# Patient Record
Sex: MALE | Race: WHITE | URBAN - METROPOLITAN AREA
[De-identification: names, ages, dates, MRNs, and addresses within clinical notes are randomized per-mention and may not be internally consistent; named-entity substitution may affect disease eponyms.]

---

## 2018-08-18 ENCOUNTER — EMERGENCY (EMERGENCY)
Facility: HOSPITAL | Age: 33
LOS: 0 days | Discharge: HOME | End: 2018-08-18
Attending: EMERGENCY MEDICINE | Admitting: EMERGENCY MEDICINE

## 2018-08-18 VITALS
RESPIRATION RATE: 18 BRPM | SYSTOLIC BLOOD PRESSURE: 128 MMHG | HEART RATE: 61 BPM | TEMPERATURE: 98 F | DIASTOLIC BLOOD PRESSURE: 75 MMHG | OXYGEN SATURATION: 99 %

## 2018-08-18 VITALS
SYSTOLIC BLOOD PRESSURE: 167 MMHG | RESPIRATION RATE: 18 BRPM | HEART RATE: 56 BPM | DIASTOLIC BLOOD PRESSURE: 84 MMHG | OXYGEN SATURATION: 96 % | TEMPERATURE: 98 F

## 2018-08-18 DIAGNOSIS — K08.89 OTHER SPECIFIED DISORDERS OF TEETH AND SUPPORTING STRUCTURES: ICD-10-CM

## 2018-08-18 NOTE — ED PROVIDER NOTE - NS ED ROS FT
Review of Systems:  	•	CONSTITUTIONAL - no fever, no diaphoresis, no chills  	•	SKIN - no rash  	•	EYES - no eye pain, no blurry vision  	•	ENT - + dental pain   	•	RESPIRATORY - no shortness of breath, no cough  	•	CARDIAC - no chest pain, no palpitations

## 2018-08-18 NOTE — ED PROVIDER NOTE - OBJECTIVE STATEMENT
33 year old male with no pmhx presents with dental pain. Pt admits left lower tooth. pt went to dentist earlier today, given clindamycin. Pt denies swelling or fever.

## 2018-08-18 NOTE — ED PROVIDER NOTE - ATTENDING CONTRIBUTION TO CARE
34 yo m  presents with toothache.  Tender in left lower teeth   .  No abscess.  Requesting dental block.  Dental consult .  already on clindamycin.  will f/u with [facility], private on monday

## 2018-08-18 NOTE — ED PROVIDER NOTE - PHYSICAL EXAMINATION
CONST: Well appearing in NAD  EYES: PERRL, EOMI, Sclera and conjunctiva clear. Vision 20/20  ENT: + tenderness tooth 19, no swelling or abscess, No nasal discharge. TM's clear B/L without drainage. Oropharynx normal appearing, no erythema or exudates. Uvula midline.  SKIN: Warm, dry, no acute rashes. Good turgor

## 2018-08-18 NOTE — CONSULT NOTE ADULT - SUBJECTIVE AND OBJECTIVE BOX
Patient is a 33y old  Male who presents with a chief complaint of dental pain from carious #19.    HPI: Pt reports he's be experiencing on and off dental pain for over 2 months. Patient went to see his private dentist this past Monday for treatment and was prescribed Amoxicillin 500mg, and Ibuprofen 800mg. Symptoms subsided initially, but pain came back on Thursday. Pt says he stopped taking the amoxicillin initially after pain went away and he did not finish taking the prescribed course of amoxicillin. Patient went back to his dentist at 4pm today and was prescribed Clindamycin 300mg. Pain level: 10/10. No fever, no facial swelling. NKDA      PAST MEDICAL & SURGICAL HISTORY:    ( -  ) heart valve replacement  ( -  ) joint replacement      MEDICATIONS  (STANDING):  Clindamycin 300mg    MEDICATIONS  (PRN):  Ibuprofen 600mg    REVIEW OF SYSTEMS      General:	    Skin/Breast:  	  Ophthalmologic:  	  ENMT:	    Respiratory and Thorax:  	  Cardiovascular:	    Gastrointestinal:	    Genitourinary:	    Musculoskeletal:	    Neurological:	    Psychiatric:	    Hematology/Lymphatics:	    Endocrine:	    Allergic/Immunologic:     Allergies: NKDA            FAMILY HISTORY: Patient reports no pertinent family history.      *SOCIAL HISTORY: Not know if ever smoked.    Vital Signs Last 24 Hrs  T(C): 36.7 (18 Aug 2018 21:10), Max: 36.7 (18 Aug 2018 21:10)  T(F): 98.1 (18 Aug 2018 21:10), Max: 98.1 (18 Aug 2018 21:10)  HR: 56 (18 Aug 2018 21:10) (56 - 56)  BP: 167/84 (18 Aug 2018 21:10) (167/84 - 167/84)  BP(mean): --  RR: 18 (18 Aug 2018 21:10) (18 - 18)  SpO2: 96% (18 Aug 2018 21:10) (96% - 96%)    LABS: none        Last Dental Visit: <<less than a week ago  >>    EOE:  TMJ ( -  ) clicks                     ( -  ) pops                     ( -  ) crepitus             Mandible <<FROM>>             Facial bones and MOM <<grossly intact>>             ( -  ) trismus             ( -  ) lymphadenopathy             ( -  ) swelling             ( -  ) asymmetry             ( -  ) palpation             ( -  ) dyspnea             ( -  ) dysphagia             ( -  ) loss of consciousness    IOE:  <<permanent>> dentition:           <<multiple carious teeth>> and            <<multiple missing teeth>>                                 Caries <<#19, 33  >>                hard/soft palate:  ( -  ) palatal torus, <<small, 1mm nodule,  noted on uvula.>>            tongue/FOM <<No pathology noted>>            labial/buccal mucosa <<No pathology noted>>           ( +  ) percussion           ( +  ) palpation           ( -  ) swelling            ( -  ) abscess           ( -  ) sinus tract    *DENTAL RADIOGRAPHS: none    RADIOLOGY & ADDITIONAL STUDIES: none    *ASSESSMENT: Dental pain from carious #19. No abscess/swelling, no fever.    *PLAN: Palliative dental block, F/U with private dentist on Monday. Patient already on Clindamycin 300mg.    PROCEDURE: Palliative dental block  Verbal and written consent given.  Anesthesia: <<1 cartridge 0.5% Marcaine (9mg) 1:200,000 epinephrine via left inferior alveolar nerve block    >>   Treatment: <<Exam, Palliative dental block   >>     RECOMMENDATIONS:  1) Discharge, Continue Clindamycin treatment as prescribed by private dentist.  2) Dental F/U with outpatient dentist for comprehensive dental care.   3) If any difficulty swallowing/breathing, fever occur, return to ER.     Yao King DMD  Spectra: [phone]

## 2018-08-18 NOTE — ED ADULT NURSE NOTE - NSIMPLEMENTINTERV_GEN_ALL_ED
Implemented All Universal Safety Interventions:  [place] to call system. Call bell, personal items and telephone within reach. Instruct patient to call for assistance. Room bathroom lighting operational. Non-slip footwear when patient is off stretcher. Physically safe environment: no spills, clutter or unnecessary equipment. Stretcher in lowest position, wheels locked, appropriate side rails in place.

## 2018-10-16 ENCOUNTER — TRANSCRIPTION ENCOUNTER (OUTPATIENT)
Age: 33
End: 2018-10-16

## 2018-12-20 ENCOUNTER — EMERGENCY (EMERGENCY)
Facility: HOSPITAL | Age: 33
LOS: 0 days | Discharge: HOME | End: 2018-12-20
Attending: EMERGENCY MEDICINE | Admitting: EMERGENCY MEDICINE

## 2018-12-20 VITALS
DIASTOLIC BLOOD PRESSURE: 69 MMHG | SYSTOLIC BLOOD PRESSURE: 119 MMHG | WEIGHT: 149.03 LBS | RESPIRATION RATE: 18 BRPM | TEMPERATURE: 97 F | OXYGEN SATURATION: 100 % | HEART RATE: 74 BPM

## 2018-12-20 DIAGNOSIS — M77.9 ENTHESOPATHY, UNSPECIFIED: ICD-10-CM

## 2018-12-20 DIAGNOSIS — M25.579 PAIN IN UNSPECIFIED ANKLE AND JOINTS OF UNSPECIFIED FOOT: ICD-10-CM

## 2018-12-20 RX ORDER — IBUPROFEN 200 MG
600 TABLET ORAL ONCE
Qty: 0 | Refills: 0 | Status: COMPLETED | OUTPATIENT
Start: 2018-12-20 | End: 2018-12-20

## 2018-12-20 RX ADMIN — Medication 600 MILLIGRAM(S): at 22:05

## 2018-12-20 NOTE — ED PROVIDER NOTE - MEDICAL DECISION MAKING DETAILS
33 male here with foot pain has new job at [employer] with extensive walking, will continue outpatient management with supportive care

## 2018-12-20 NOTE — ED PROVIDER NOTE - PHYSICAL EXAMINATION
CONSTITUTIONAL: Well-developed; well-nourished; in no acute distress, speaking in full sentences  SKIN: warm, dry  HEAD: Normocephalic; atraumatic  EYES: PERRL, EOMI, no conjunctival erythema  ENT: No nasal discharge; airway clear, mucous membranes moist  NECK: Supple; non tender, FROM  CARD: +S1, S2 no murmurs, gallops, or rubs. Regular rate and rhythm. radial 2+  RESP: No wheezes, rales or rhonchi. CTABL  EXT: moves all extremities, ambulates wo assistance No clubbing, cyanosis or edema. no bony tenderness willian, les equal in color, temp size, le strength and sensation grossly normal, R DP 2+, R toe cap refill wnl, ambulates wo hesitation or assistance, gets on and off exam table independently, christie test shows intact achilles   NEURO: Alert, oriented, grossly unremarkable, no focal deficits, cn ii-xii grossly intact  PSYCH: Cooperative, appropriate

## 2018-12-20 NOTE — ED ADULT NURSE NOTE - NSIMPLEMENTINTERV_GEN_ALL_ED
Implemented All Universal Safety Interventions:  [place] to call system. Call bell, personal items and telephone within reach. Instruct patient to call for assistance. Room bathroom lighting operational. Non-slip footwear when patient is off stretcher. Physically safe environment: no spills, clutter or unnecessary equipment. Stretcher in lowest position, wheels locked, appropriate side rails in place.

## 2018-12-20 NOTE — ED PROVIDER NOTE - NSFOLLOWUPCLINICS_GEN_ALL_ED_FT
[facility]  Rehabilitation  [address] [place] [address]  Phone: [phone]  Fax:   Follow Up Time: 1-3 Days

## 2018-12-20 NOTE — ED PROVIDER NOTE - OBJECTIVE STATEMENT
32yo m no sig pmhx presents CC R posterior ankle pain x 1 week. pt reports that he has been working a lot more the past week or so and his job involves standing and movement of les all day long. says pain is worse with extreme dorsiflexion or plantar flexion. no weakness, numbness, tingling. denies trauma to area.

## 2018-12-20 NOTE — ED PROVIDER NOTE - ATTENDING CONTRIBUTION TO CARE
I personally evaluated the patient. I reviewed the Resident’s or Physician Assistant’s note (as assigned above), and agree with the findings and plan except as documented in my note.     33 male here for eval of right medial foot pain at arch. Works for [employer] with long days and extensive walking.     PE: male in no distress. EXT: no bony deformities. no ecchymoses. no swelling. distally NVI. point tenderness at right medial inferior aspect. no crepitus.     Impression: tendinitis    Plan: supportive care and reevaluation

## 2018-12-20 NOTE — ED PROVIDER NOTE - NS ED ROS FT
General: No fevers, chills, nausea, vomiting  Eyes:  No visual changes, eye pain or discharge.  ENMT:  No hearing changes, pain,   Cardiac:  No chest pain, SOB or edema.  Respiratory:  No cough or respiratory distress.   GI:  No nausea, vomiting, diarrhea or abdominal pain.  :  No dysuria  MS:  No  back pain.  Neuro:  No headache or weakness.  No LOC.  Skin:  No skin rash.   Endocrine: No history of thyroid disease or diabetes.

## 2018-12-27 ENCOUNTER — EMERGENCY (EMERGENCY)
Facility: HOSPITAL | Age: 33
LOS: 0 days | Discharge: HOME | End: 2018-12-28
Attending: EMERGENCY MEDICINE | Admitting: EMERGENCY MEDICINE

## 2018-12-27 DIAGNOSIS — M79.671 PAIN IN RIGHT FOOT: ICD-10-CM

## 2018-12-28 VITALS
SYSTOLIC BLOOD PRESSURE: 123 MMHG | WEIGHT: 160.06 LBS | HEIGHT: 69 IN | DIASTOLIC BLOOD PRESSURE: 89 MMHG | RESPIRATION RATE: 20 BRPM | TEMPERATURE: 98 F | OXYGEN SATURATION: 98 % | HEART RATE: 84 BPM

## 2018-12-28 NOTE — ED PROVIDER NOTE - OBJECTIVE STATEMENT
34 y/o M with no PMH presents with r sided foot pain. Pt recently diagnosed with tendonitis of the right foot presents reqquesting work note for 3 days. Denies any injuries. Able to ambulate w/out the limp. No CP, SOB.

## 2018-12-28 NOTE — ED ADULT NURSE NOTE - NSIMPLEMENTINTERV_GEN_ALL_ED
Implemented All Universal Safety Interventions:  [place] to call system. Call bell, personal items and telephone within reach. Instruct patient to call for assistance. Room bathroom lighting operational. Non-slip footwear when patient is off stretcher. Physically safe environment: no spills, clutter or unnecessary equipment. Stretcher in lowest position, wheels locked, appropriate side rails in place.

## 2019-08-02 ENCOUNTER — TRANSCRIPTION ENCOUNTER (OUTPATIENT)
Age: 34
End: 2019-08-02

## 2020-01-03 ENCOUNTER — EMERGENCY (EMERGENCY)
Facility: HOSPITAL | Age: 35
LOS: 0 days | Discharge: HOME | End: 2020-01-03
Attending: EMERGENCY MEDICINE | Admitting: EMERGENCY MEDICINE
Payer: MEDICAID

## 2020-01-03 VITALS
RESPIRATION RATE: 17 BRPM | DIASTOLIC BLOOD PRESSURE: 78 MMHG | OXYGEN SATURATION: 99 % | HEART RATE: 76 BPM | SYSTOLIC BLOOD PRESSURE: 123 MMHG | TEMPERATURE: 97 F
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
## 2020-01-03 DIAGNOSIS — S92.152A DISPLACED AVULSION FRACTURE (CHIP FRACTURE) OF LEFT TALUS, INITIAL ENCOUNTER FOR CLOSED FRACTURE: ICD-10-CM

## 2020-01-03 DIAGNOSIS — M25.572 PAIN IN LEFT ANKLE AND JOINTS OF LEFT FOOT: ICD-10-CM
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
## 2020-01-03 DIAGNOSIS — M25.511 PAIN IN RIGHT SHOULDER: ICD-10-CM
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
## 2020-01-03 PROCEDURE — 29515 APPLICATION SHORT LEG SPLINT: CPT

## 2020-01-03 PROCEDURE — 73630 X-RAY EXAM OF FOOT: CPT | Mod: 26,LT

## 2020-01-03 PROCEDURE — 99284 EMERGENCY DEPT VISIT MOD MDM: CPT | Mod: 25

## 2020-01-03 PROCEDURE — 73610 X-RAY EXAM OF ANKLE: CPT | Mod: 26,LT

## 2020-01-03 PROCEDURE — 73030 X-RAY EXAM OF SHOULDER: CPT | Mod: 26,RT

## 2020-01-03 NOTE — ED PROVIDER NOTE - CLINICAL SUMMARY MEDICAL DECISION MAKING FREE TEXT BOX
x ray images reviewed with pt and family. Avulsion suspected off talus, Will place splint, Rec ice, elevate, NWB and follow up with Ortho. Pt verbalizes understanding

## 2020-01-03 NOTE — ED PROVIDER NOTE - ATTENDING CONTRIBUTION TO CARE
33 yo M presents with c/o left ankle pain and right shoulder pain after injuring yesterday while playing soccer. Pain with weight bearing and movement of shoulder. On exam pt in NAD AAO x 3, no midline vertebral tenderness, + tender right shoulder at AC joint, good ROM, + swelling left ankle with tenderness anterior, medial, skin intact, knee non tender

## 2020-01-03 NOTE — ED PROVIDER NOTE - CARE PROVIDERS DIRECT ADDRESSES
[email or web address]

## 2020-01-03 NOTE — ED PROVIDER NOTE - NS ED ROS FT
MS:  + ankle pain, No myalgia, muscle weakness, back pain.  Neuro:  No headache or weakness.  No LOC.  Skin:  No skin rash.   Endocrine: No history of thyroid disease or diabetes.  Except as documented in the HPI,  all other systems are negative.

## 2020-01-03 NOTE — ED ADULT TRIAGE NOTE - TEMPERATURE IN CELSIUS (DEGREES C)
36.1 H/O repair of left rotator cuff    S/P appendectomy    S/P  Section    S/P Repair of Ventral Hernia    S/P Tonsillectomy    Umbilical Hernia  REPAIR -

## 2020-01-03 NOTE — ED PROVIDER NOTE - OBJECTIVE STATEMENT
Pt is a 33y/o male presents today for eval of left ankle pain s/p inversion injury yesterday while playing soccer. Pt noticed swelling and had increased pain which prompted visit. Pt denies fever, chills, weakness, head trauma.

## 2020-01-03 NOTE — ED PROVIDER NOTE - PHYSICAL EXAMINATION
VITAL SIGNS: I have reviewed nursing notes and confirm.  CONSTITUTIONAL: Well-developed; well-nourished; in no acute distress.   SKIN: skin exam is warm and dry, no acute rash.    HEAD: Normocephalic; atraumatic.  EYES:  conjunctiva and sclera clear.  ENT: No nasal discharge; airway clear.  EXT: edema/ecchymosis/TTP to left medial malleolus, sensation intact Normal ROM.  No clubbing, cyanosis   NEURO: Alert, oriented, grossly unremarkable

## 2020-01-03 NOTE — ED PROVIDER NOTE - CARE PROVIDER_API CALL
Gautam Mc (MD)  Orthopaedic Surgery  [address] [place] [address]  Phone: [phone]  Fax: [phone]  Follow Up Time:

## 2020-01-03 NOTE — ED PROVIDER NOTE - PATIENT PORTAL LINK FT
You can access the FollowMyHealth Patient Portal offered by [facility] by registering at the following website: [email or web address]. By joining [employer]’s FollowMyHealth portal, you will also be able to view your health information using other applications (apps) compatible with our system.

## 2020-01-08 ENCOUNTER — OUTPATIENT (OUTPATIENT)
Dept: OUTPATIENT SERVICES | Facility: HOSPITAL | Age: 35
LOS: 1 days | Discharge: HOME | End: 2020-01-08

## 2020-01-08 ENCOUNTER — APPOINTMENT (OUTPATIENT)
Dept: ORTHOPEDIC SURGERY | Facility: CLINIC | Age: 35
End: 2020-01-08

## 2020-01-08 PROBLEM — Z00.00 ENCOUNTER FOR PREVENTIVE HEALTH EXAMINATION: Status: ACTIVE | Noted: 2020-01-08

## 2020-01-15 ENCOUNTER — APPOINTMENT (OUTPATIENT)
Dept: ORTHOPEDIC SURGERY | Facility: CLINIC | Age: 35
End: 2020-01-15

## 2020-01-15 ENCOUNTER — OUTPATIENT (OUTPATIENT)
Dept: OUTPATIENT SERVICES | Facility: HOSPITAL | Age: 35
LOS: 1 days | Discharge: HOME | End: 2020-01-15

## 2020-01-17 ENCOUNTER — TRANSCRIPTION ENCOUNTER (OUTPATIENT)
Age: 35
End: 2020-01-17

## 2021-05-25 NOTE — ED ADULT TRIAGE NOTE - WEIGHT IN KG
[FreeTextEntry3] : I, Sreedhar Cerda, authored this note working as a medical scribe for Dr. Hameed.  05/25/2021. 10:00AM. This note was authored by the medical scribe for me. I have reviewed, edited, and revised the note as needed. I am in agreement with the exam findings, imaging findings, and treatment plan.  Kimani Hameed MD  67.6

## 2023-04-25 ENCOUNTER — EMERGENCY (EMERGENCY)
Facility: HOSPITAL | Age: 38
LOS: 0 days | Discharge: HOME | End: 2023-04-25
Attending: EMERGENCY MEDICINE
Payer: MEDICAID

## 2023-04-25 VITALS
HEART RATE: 91 BPM | OXYGEN SATURATION: 98 % | RESPIRATION RATE: 18 BRPM | DIASTOLIC BLOOD PRESSURE: 78 MMHG | HEIGHT: 62 IN | WEIGHT: 128.97 LBS | SYSTOLIC BLOOD PRESSURE: 145 MMHG

## 2023-04-25 DIAGNOSIS — F17.200 NICOTINE DEPENDENCE, UNSPECIFIED, UNCOMPLICATED: ICD-10-CM

## 2023-04-25 DIAGNOSIS — K03.81 CRACKED TOOTH: ICD-10-CM

## 2023-04-25 DIAGNOSIS — R68.83 CHILLS (WITHOUT FEVER): ICD-10-CM

## 2023-04-25 DIAGNOSIS — Z86.59 PERSONAL HISTORY OF OTHER MENTAL AND BEHAVIORAL DISORDERS: ICD-10-CM

## 2023-04-25 DIAGNOSIS — K02.9 DENTAL CARIES, UNSPECIFIED: ICD-10-CM

## 2023-04-25 PROCEDURE — 99284 EMERGENCY DEPT VISIT MOD MDM: CPT

## 2023-04-25 PROCEDURE — 99283 EMERGENCY DEPT VISIT LOW MDM: CPT

## 2023-04-25 RX ORDER — IBUPROFEN 200 MG
600 TABLET ORAL ONCE
Refills: 0 | Status: COMPLETED | OUTPATIENT
Start: 2023-04-25 | End: 2023-04-25

## 2023-04-25 RX ORDER — KETOROLAC TROMETHAMINE 30 MG/ML
30 SYRINGE (ML) INJECTION ONCE
Refills: 0 | Status: DISCONTINUED | OUTPATIENT
Start: 2023-04-25 | End: 2023-04-25

## 2023-04-25 RX ADMIN — Medication 1 TABLET(S): at 21:38

## 2023-04-25 RX ADMIN — Medication 600 MILLIGRAM(S): at 21:51

## 2023-04-25 NOTE — ED PROVIDER NOTE - CLINICAL SUMMARY MEDICAL DECISION MAKING FREE TEXT BOX
Dental caries without abscess, likely has exposed root. No signs of tracking infection. Will dc with analgesia, abx, dental follow up, return precautions.

## 2023-04-25 NOTE — ED PROVIDER NOTE - NSFOLLOWUPCLINICS_GEN_ALL_ED_FT
[facility]  Dental  [address] [place] [id number]  Phone: [phone]  Fax:   Follow Up Time: Urgent

## 2023-04-25 NOTE — ED PROVIDER NOTE - NSFOLLOWUPINSTRUCTIONS_ED_ALL_ED_FT
GO TO THE DENTAL CLINIC AT 8AM. IT IS FIRST COME FIRST SERVED SO TRY TO ARRIVE EARLY!    Dental Caries, Adult    Dental caries or cavities are areas of decay in the outer layers (enamel and dentin) of your tooth. When you eat or drink sugary foods and liquids, the natural bacteria in your mouth break down those sugars and produce a lot of acids. The acids destroy the protective layer of your tooth, leading to tooth decay.    It is important to treat your tooth decay as soon as possible. Untreated dental caries can spread decay and may lead to a painful infection. Keeping your mouth clean (good oral hygiene) by brushing regularly with fluoride toothpaste, flossing, and getting regular dental checkups can help reduce the bacteria and prevent dental caries.    What are the causes?  Dental caries are caused by the acid that is produced when bacteria in your mouth break down sugary foods and liquids.    What increases the risk?  This condition is more likely to develop in people who:  Drink a lot of sugary liquids, including alcoholic drinks, such as champagne.  Eat a lot of sweets and carbohydrates.  Drink water that is not treated with fluoride.  Have poor oral hygiene.  Have deep grooves in their teeth.  Take certain medicines that decrease saliva.  What are the signs or symptoms?  Symptoms of dental caries include:  White, brown, or black spots on the teeth.  Pain as the decay progresses.  Swelling or bleeding in the gums.  How is this diagnosed?  This condition may be diagnosed based on:  Your signs and symptoms.  Oral exams. This includes probing the hardness of the tooth with an instrument called a dental explorer.  Dental X-rays to look for dental caries between teeth. This is also used to confirm the diagnosis.  Sometimes special lights, dyes, or probes, which use electrical conductivity or laser reflection, can assist in finding dental caries.    How is this treated?  Treatment for dental caries usually involves a procedure to remove the decay and restore the tooth. Restoring the tooth using a filling can be done in the dentist's office. More complex restorations can be created in a lab.    Follow these instructions at home:    Practice good oral hygiene. This keeps your mouth and gums healthy.  Use a fluoride-containing toothpaste to brush your teeth twice a day. Floss once a day.  If your dental caries have caused an infection, you may be given an antibiotic medicine. Take it as told by your dentist. Do not stop taking the antibiotic even if you start to feel better.  Keep all follow-up visits as told by your dentist. This is important.  Follow-up visits include regular cleanings. You will be told how often this needs to be done.  How is this prevented?  To prevent dental caries:  Brush your teeth every morning and night with fluoride toothpaste.  Floss your teeth once a day.  Get regular dental cleanings.  If told by your dentist, wash your mouth with prescription mouthwash (chlorhexidine) and apply topical fluoride to your teeth.  Drink water that has fluoride added to it.  Drink water instead of sugary drinks.  Eat healthy meals and snacks.  If prescribed by your dentist, have additional in-office fluoride treatments and sealants placed on your teeth.  Contact a health care provider if:  You have symptoms of tooth decay.  Summary  Dental caries or cavities are areas of decay in the outer layers of your tooth. It is important to treat your tooth decay as soon as possible.  This condition is caused by the acid that is produced when bacteria in your mouth break down sugary foods and liquids.  To prevent this condition, practice good oral hygiene. This keeps your mouth and gums healthy by brushing and flossing. Use fluoride toothpaste.  Take an antibiotic to treat an infection, if told by your dentist. Do not stop taking the antibiotic even if your condition gets better.  Have regular dental cleanings and keep all follow-up visits.  This information is not intended to replace advice given to you by your health care provider. Make sure you discuss any questions you have with your health care provider.

## 2023-04-25 NOTE — ED PROVIDER NOTE - PATIENT PORTAL LINK FT
You can access the FollowMyHealth Patient Portal offered by [facility] by registering at the following website: [email or web address]. By joining [employer]’s FollowMyHealth portal, you will also be able to view your health information using other applications (apps) compatible with our system.

## 2023-04-25 NOTE — ED PROVIDER NOTE - PHYSICAL EXAMINATION
VITAL SIGNS: I have reviewed nursing notes and confirm.  CONSTITUTIONAL: Well-developed; well-nourished; in pain, no distress  SKIN: Skin exam is warm and dry, no acute rash.  HEAD: Normocephalic; atraumatic.  EYES: PERRL, EOM intact; conjunctiva and sclera clear.  ENT: No nasal discharge; airway clear, no swelling, poor dentition, no abscess, no trismus, drooling, stridor  CARD: S1, S2 normal; no murmurs, gallops, or rubs. Regular rate and rhythm.  RESP: No wheezes, rales or rhonchi.  ABD: Normal bowel sounds; soft; non-distended; non-tender  EXT: Normal ROM. No clubbing, cyanosis or edema.  LYMPH: No acute cervical adenopathy.  NEURO: Alert, oriented. Grossly unremarkable. No focal deficits.  PSYCH: Cooperative, appropriate.

## 2023-04-25 NOTE — ED ADULT NURSE NOTE - NSICDXNOPASTSURGICALHX_GEN_ALL_CORE
[facility] called back with a Dr from [address] [place] [address] on the phone. They refused due to capacity. [facility] said that every other hospital on the list I gave did the same --  Natalee Zuñgia of [address] [place] [address] talked to Dr Ant Madrid again. He said he won't accept this pt. The pt needs tertiary level car; tell the transfer center to keep trying.      Courtney Hart  01/30/22 [id number] <-- Click to add NO significant Past Surgical History

## 2023-04-25 NOTE — ED PROVIDER NOTE - OBJECTIVE STATEMENT
BMP/CBC/EKG
37 yo M, former alcohol use disorder, here for assessment of R lower mandible pain. Notes he has missing/cracked teeth and is having sharp pain radiating to ear. Pain has been intermittent x weeks, constant x 3 days. No fever, trismus, drooling, change in voice, swelling.     Has been using marijuana for analgesia.

## 2023-04-26 ENCOUNTER — EMERGENCY (EMERGENCY)
Facility: HOSPITAL | Age: 38
LOS: 0 days | Discharge: HOME | End: 2023-04-26
Attending: EMERGENCY MEDICINE
Payer: MEDICAID

## 2023-04-26 VITALS
OXYGEN SATURATION: 99 % | WEIGHT: 130.07 LBS | HEART RATE: 62 BPM | RESPIRATION RATE: 18 BRPM | HEIGHT: 62 IN | DIASTOLIC BLOOD PRESSURE: 62 MMHG | TEMPERATURE: 98 F | SYSTOLIC BLOOD PRESSURE: 130 MMHG

## 2023-04-26 DIAGNOSIS — K08.89 OTHER SPECIFIED DISORDERS OF TEETH AND SUPPORTING STRUCTURES: ICD-10-CM

## 2023-04-26 DIAGNOSIS — F17.200 NICOTINE DEPENDENCE, UNSPECIFIED, UNCOMPLICATED: ICD-10-CM

## 2023-04-26 DIAGNOSIS — K02.9 DENTAL CARIES, UNSPECIFIED: ICD-10-CM

## 2023-04-26 PROCEDURE — 64400 NJX AA&/STRD TRIGEMINAL NRV: CPT

## 2023-04-26 PROCEDURE — 99285 EMERGENCY DEPT VISIT HI MDM: CPT

## 2023-04-26 PROCEDURE — 99284 EMERGENCY DEPT VISIT MOD MDM: CPT | Mod: 25

## 2023-04-26 RX ORDER — IBUPROFEN 200 MG
1 TABLET ORAL
Qty: 28 | Refills: 0
Start: 2023-04-26 | End: 2023-05-02

## 2023-04-26 NOTE — CONSULT NOTE ADULT - ASSESSMENT
Patient is a 38y old  Male who presents with a chief complaint of pain on lower right       *ASSESSMENT: #30 and #31 are non-restorable. no intra-oral swelling noted.       *PLAN: Patient has [employer] insurance which is not accepted by this clinic. Patient informed to start taking antibiotics and ibuprofen prescribed by the emergency room as soon as possible. Patient advised to call his insurance and obtain information about a dentist who would accept his insurance for extraction.       RECOMMENDATIONS:  1) start taking amoxicillin and pain medications prescribed by emergency department  2) Dental F/U with outpatient dentist for comprehensive dental care.   3) If any difficulty swallowing/breathing, fever occur, return to ER.     Jose Seymour, pager #[phone]

## 2023-04-26 NOTE — ED PROVIDER NOTE - OBJECTIVE STATEMENT
39 yo m with no pmh presents with R dental pain and request to go to dental clinic.  was seen at [facility] ED last night for same pain.  told to go to clinic this am.  pt was given toradol and started on abx, but pt says he didn't start it.  pt says pain has been ongoing for a few months, worse recently.  no diff swallowing or breathing.

## 2023-04-26 NOTE — CONSULT NOTE ADULT - SUBJECTIVE AND OBJECTIVE BOX
Patient is a 38y old  Male who presents with a chief complaint of pain on lower right     HPI: Patient presented to clinic with discharge note from his visit to [facility] ER yesterday where he was prescribed amoxicillin 500mg and Ibuprofen 600mg.       PAST MEDICAL & SURGICAL HISTORY:  No pertinent past medical history    No significant past surgical history    (  - ) heart valve replacement  ( -  ) joint replacement  (  - ) pregnancy    MEDICATIONS  (STANDING):    MEDICATIONS  (PRN):      Allergies    No Known Allergies    Intolerances        FAMILY HISTORY:  No pertinent family history in first degree relatives        *SOCIAL HISTORY: (   ) Tobacco; (   ) ETOH    *Last Dental Visit:    Vital Signs Last 24 Hrs  T(C): 36.7 (26 Apr 2023 09:18), Max: 36.7 (26 Apr 2023 09:18)  T(F): 98.1 (26 Apr 2023 09:18), Max: 98.1 (26 Apr 2023 09:18)  HR: 62 (26 Apr 2023 09:18) (62 - 91)  BP: 130/62 (26 Apr 2023 09:18) (130/62 - 145/78)  BP(mean): --  RR: 18 (26 Apr 2023 09:18) (18 - 18)  SpO2: 99% (26 Apr 2023 09:18) (98% - 99%)    Parameters below as of 26 Apr 2023 09:18  Patient On (Oxygen Delivery Method): room air  Vitals taken in clinic:   BP: 137/65  P43  T97.9      LABS: none    EOE:  TMJ (-   ) clicks                     (  - ) pops                     (  - ) crepitus             Mandible <<FROM>>             Facial bones and MOM <<grossly intact>>             (  - ) trismus             (  - ) lymphadenopathy             ( -  ) swelling             ( -  ) asymmetry             ( -  ) palpation             ( -  ) dyspnea             (  - ) dysphagia             (  - ) loss of consciousness    IOE:  <<permanent dentition: multiple carious teeth           hard/soft palate:  (   ) palatal torus, <<No pathology noted>>           tongue/FOM <<No pathology noted>>           labial/buccal mucosa <<No pathology noted>>           ( +  ) percussion           (  - ) palpation           (-   ) swelling            (  - ) abscess           ( -  ) sinus tract    Dentition present: << y  >>  Mobility: <<n  >>  Caries: <<y   >>         *DENTAL RADIOGRAPHS: 1 periapical radiograph taken    RADIOLOGY & ADDITIONAL STUDIES:    *ASSESSMENT: #30 and #31 are non-restorable. no intra-oral swelling noted.       *PLAN: Patient has [employer] insurance which is not accepted by this clinic. Patient informed to start taking antibiotics and ibuprofen prescribed by the emergency room as soon as possible. Patient advised to call his insurance and obtain information about a dentist who would accept his insurance for extraction.     PROCEDURE:   Verbal and written consent given.  Anesthesia: << administered 1 carpule of 0.5% Marcaine with 1:200,000 epinephrine as inferior alveolar nerve block    >>   Treatment: << none   >>

## 2023-05-12 ENCOUNTER — NON-APPOINTMENT (OUTPATIENT)
Age: 38
End: 2023-05-12

## 2024-05-30 ENCOUNTER — EMERGENCY (EMERGENCY)
Facility: HOSPITAL | Age: 39
LOS: 0 days | Discharge: HOME | End: 2024-05-31
Attending: EMERGENCY MEDICINE
Payer: MEDICAID

## 2024-05-30 VITALS
TEMPERATURE: 98 F | WEIGHT: 147.93 LBS | DIASTOLIC BLOOD PRESSURE: 66 MMHG | HEART RATE: 67 BPM | RESPIRATION RATE: 18 BRPM | SYSTOLIC BLOOD PRESSURE: 115 MMHG | OXYGEN SATURATION: 99 %
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
## 2024-05-30 DIAGNOSIS — L53.9 ERYTHEMATOUS CONDITION, UNSPECIFIED: ICD-10-CM

## 2024-05-30 DIAGNOSIS — S70.312A ABRASION, LEFT THIGH, INITIAL ENCOUNTER: ICD-10-CM
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
## 2024-05-30 PROCEDURE — 99284 EMERGENCY DEPT VISIT MOD MDM: CPT

## 2024-05-30 PROCEDURE — 99283 EMERGENCY DEPT VISIT LOW MDM: CPT

## 2024-05-30 NOTE — ED ADULT NURSE NOTE - MODE OF DISCHARGE
Patient Name: Jane Astudillo  Person calling: Jane Astudillo  Facility:   Contact Phone: [phone]  Patient informed to contact his or her pharmacy? No  Medication/ Dosage: TRAMADOL    Last appt: 4/6/2018      Pharmacy:   [id number] - [facility] [address] AT [facility] [address]  [address]  [facility] [address]  Phone: [phone] Fax: [phone]    [facility] - [address]  [address] [facility]  [address]  [facility] [address]  Phone: [phone] Fax: [phone]    [id number] - [place], [place] - [address] [place]  [address]  [facility] [address]  Phone: [phone] Fax: [phone]    Additional Info: Patient's son, Nima, will be stopping at Dr. Coronado's clinic this morning with a form from [facility] for disabled parking signature by Dr. Coronado and would like to  patient's prescription, if possible.  Thanks.   Ambulatory

## 2024-05-30 NOTE — ED ADULT TRIAGE NOTE - NS ED TRIAGE AVPU SCALE
Alert-The patient is alert, awake and responds to voice. The patient is oriented to time, place, and person. The triage nurse is able to obtain subjective information.
got both doses of covid vaccine staff does not know the dates

## 2024-05-30 NOTE — ED ADULT NURSE NOTE - HIV OFFER
We recommend the following for your condition:  You were evaluated today for your left knee. Your evaluation, including X-rays of your left knee, did not show signs of fractures or other acute abnormalities which require further intervention at this time.  Your left knee has been ace wrapped and you were given crutches to help your left knee heal. Please rest, ice and elevate your left knee, and resume normal activities as tolerated.  We recommend you take 600mg ibuprofen every 6 hours or tylenol 650mg every 6 hours as needed for pain. If needed, you can alternate these medications so that you take one medication every 3 hours. For instance, at noon take ibuprofen, then at 3pm take tylenol, then at 6pm take ibuprofen.   Please follow up with your primary care physician within three days.  Go to the Emergency Department if you experience worsening or uncontrolled pain, numbness, tingling, or weakness, difficulty walking, worsening swelling or pain or redness despite pain medication, or any other concerning symptoms.  Thank you for choosing us for your care.     Opt out

## 2024-05-30 NOTE — ED ADULT TRIAGE NOTE - CHIEF COMPLAINT QUOTE
PT states he had removed a tick from his L inner thigh a week ago. States he thinks the head is still there.

## 2024-05-31 NOTE — ED PROVIDER NOTE - OBJECTIVE STATEMENT
Patient is a 39 year old male with no significant PMH presenting for insect bite. Patient states he has what appears to be a possible insect bite where the skin has a small, slightly erythematous, non-fluctuant abrasion/rash to his inner left thigh for the past several days. Patient was concerned it was a possible insect bite though does not recall being bitten by anything. Patient believes he removed a tick/insect from the area the day prior. Patient states he spends a lot of time outdoors playing soccer but denies being in any wooden areas. Patient denies chest palpitations/pain, shortness of breath, nausea, vomiting, dizziness, abdominal pain, diarrhea, constipation, headache, vision changes, confusion, or additional concerns.

## 2024-05-31 NOTE — ED PROVIDER NOTE - PATIENT PORTAL LINK FT
You can access the FollowMyHealth Patient Portal offered by [facility] by registering at the following website: [email or web address]. By joining [facility]’s FollowMyHealth portal, you will also be able to view your health information using other applications (apps) compatible with our system.

## 2024-05-31 NOTE — ED PROVIDER NOTE - CLINICAL SUMMARY MEDICAL DECISION MAKING FREE TEXT BOX
39-year-old male with no significant past medical history presents with irritated area in the left inner thigh for the past few days.  He is worried that there is a foreign body.  States that he removed a tick from that area approximately 1 week ago and is unclear exactly how long it was in there but thinks maybe about 24 hours.  States that since then it is harder and slightly red in the center.  On exam nontoxic, vital signs noted, 1 x 1 cm area to the left inner thigh that is firm but not erythematous and no fluctuance.  No discharge.  Center has a red scab but no obvious foreign body and can see the base.  Clinically low concern for cellulitis but given it is somewhat firm consider some induration because he has been scratching at it so will give Doxy for both prophylaxis for Lyme disease and possible cellulitis.  Return precautions discussed.  In my opinion, based on current evaluation and results, an acute medical or surgical emergency does not appear to be occurring at this time and I feel that the pt is stable for further outpatient work up and/or treatment.

## 2024-05-31 NOTE — ED PROVIDER NOTE - PHYSICAL EXAMINATION
VITAL SIGNS: I have reviewed nursing notes and confirm.  CONSTITUTIONAL: Well-appearing, non-toxic, in NAD  SKIN: 1x1cm abrasion/rash, non-erythematous, non-fluctuant area to left inner thigh; Warm dry, normal skin turgor  HEAD: NCAT  EYES: No conjunctival injection, scleral anicteric  ENT: Moist mucous membranes, normal pharynx with no erythema or exudates  NECK: Supple; full ROM. Nontender. No cervical LAD  CARD: RRR, no murmurs, rubs or gallops  RESP: Clear to ausculation bilaterally.  No rales, rhonchi, or wheezing.  ABD: Soft, non-distended, non-tender, no rebound or guarding. No CVA tenderness  EXT: Full ROM, no bony tenderness, no pedal edema, no calf tenderness  NEURO: Normal motor, normal sensory. CN II-XII grossly intact. Cerebellar testing normal. Normal gait.  PSYCH: Cooperative, appropriate.

## 2024-05-31 NOTE — ED PROVIDER NOTE - NSFOLLOWUPINSTRUCTIONS_ED_ALL_ED_FT
Insect Bite, Adult  An insect bite can make your skin red, itchy, and swollen. An insect bite is different from an insect sting, which happens when an insect injects poison (venom) into the skin.    Some insects can spread disease to people through a bite. However, most insect bites do not lead to disease and are not serious.    What are the causes?  Insects may bite for a variety of reasons, including:  Hunger.  To defend themselves.  Insects that bite include:  Spiders.  Mosquitoes and flies.  Ticks and fleas.  Ants.  Kissing bugs.  Chiggers.  What are the signs or symptoms?  In many cases, symptoms last for 2–4 days. However, itching can last up to 10 days. Symptoms include:  Itching or pain in the bite area.  Redness and swelling in the bite area.  An open wound (skin ulcer).  In rare cases, a person may have a severe allergic reaction (anaphylactic reaction) to a bite. Symptoms of an anaphylactic reaction may include:  Feeling warm in the face (flushed). This may include redness.  Itchy, red, swollen areas of skin (hives).  Swelling of the eyes, lips, face, mouth, tongue, or throat.  Wheezing or difficulty breathing, speaking, or swallowing.  Dizziness, light-headedness, or fainting.  Abdominal symptoms like cramping, nausea, vomiting, or diarrhea.  How is this diagnosed?  This condition is usually diagnosed based on symptoms and a physical exam. During the exam, your health care provider will look at the bite and ask you what kind of insect bit you.    How is this treated?  Most insect bites are not serious. Symptoms often go away on their own and treatment is not usually needed. When treatment is recommended, it may include:  Applying ice to the affected area.  Applying steroid or other anti-itch creams, like calamine lotion, to the bite area.  Medicines called antihistamines to reduce itching.  You may also need:  A tetanus shot if you are not up to date.  Antibiotic cream or an oral antibiotic if the bite becomes infected (this is uncommon).  Follow these instructions at home:  Bite area care    A sign showing that a person should not scratch an itch on the skin.   Do not scratch the bite area. It may help to cover the bite area with a bandage or close-fitting clothing.  Keep the bite area clean and dry. Wash it every day with soap and water as told by your health care provider.  Check the bite area every day for signs of infection. Check for:  More redness, swelling, or pain.  Fluid or blood.  Warmth.  Pus or a bad smell.  Managing pain, itching, and swelling    Bag of ice on a towel on the skin.  You may apply cortisone cream, calamine lotion, or a paste made of baking soda and water to the bite area as told by your health care provider.  If directed, put ice on the bite area. To do this:  Put ice in a plastic bag.  Place a towel between your skin and the bag.  Leave the ice on for 20 minutes, 2–3 times a day.  If your skin turns bright red, remove the ice right away to prevent skin damage. The risk of skin damage is higher if you cannot feel pain, heat, or cold.  General instructions    Apply or take over-the-counter and prescription medicine only as told by your health care provider.  If you were prescribed antibiotics, take or apply them as told by your health care provider. Do not stop using the antibiotic even if you start to feel better.  How is this prevented?  To help reduce your risk of insect bites:  When you are outdoors, wear clothing that covers your arms and legs. This is especially important in the early morning and evening.  Use insect repellent. The best insect repellents contain DEET, picaridin, oil of lemon eucalyptus (OLE), or OP8830.  Consider spraying your clothing with a pesticide called permethrin. Permethrin helps prevent insect bites. It works for several weeks and for up to 5–6 clothing washes. Do not apply permethrin directly to the skin.  If your home windows do not have screens, consider installing them.  If you will be sleeping in an area where there are mosquitoes, consider covering your sleeping area with a mosquito net.  Contact a health care provider if:  Your bite area has signs of infection, such as:  More redness, swelling, or pain.  Fluid or blood.  Warmth.  Pus or a bad smell.  You have a fever.  Get help right away if:  You have a rash.  You have muscle or joint pain.  You feel unusually tired or weak.  You have neck pain or a headache.  You develop symptoms of an anaphylactic reaction. These may include:  Swelling of the eyes, lips, face, mouth, tongue, or throat.  Flushed skin or hives.  Wheezing.  Difficulty breathing, speaking, or swallowing.  Dizziness, light-headedness, or fainting.  Abdominal pain, cramping, vomiting, or diarrhea.  These symptoms may be an emergency. Get help right away. Call 911.  Do not wait to see if the symptoms will go away.  Do not drive yourself to the hospital.  Summary  An insect bite can make your skin red, itchy, and swollen.  Treatment is usually not needed. Symptoms often go away on their own. When treatment is recommended, it may involve taking medicine, applying medicine to the area, or applying ice.  Apply or take over-the-counter and prescription medicines only as told by your health care provider.  Use insect repellent to help prevent insect bites.  Contact a health care provider if your bite area has signs of infection.  This information is not intended to replace advice given to you by your health care provider. Make sure you discuss any questions you have with your health care provider.

## 2024-06-04 ENCOUNTER — NON-APPOINTMENT (OUTPATIENT)
Age: 39
End: 2024-06-04

## 2024-09-06 NOTE — ED PROVIDER NOTE - CARE PLAN
[No Acute Distress] : no acute distress [Supple] : supple Principal Discharge DX:	Tendonitis [No JVD] : no jvd [Normal S1, S2] : normal s1, s2 [No Murmurs] : no murmurs [Clear to Auscultation Bilaterally] : clear to auscultation bilaterally [Normal to Percussion] : normal to percussion [No Abnormalities] : no abnormalities [Benign] : benign [Not Tender] : not tender [No HSM] : no hsm [No Clubbing] : no clubbing [No Cyanosis] : no cyanosis [No Edema] : no edema [No Focal Deficits] : no focal deficits [Oriented x3] : oriented x3

## 2025-03-28 NOTE — ED PROVIDER NOTE - IV ALTEPLASE DOOR HIDDEN
Pt in isolation to rule out cdiff. Care transitions not consulted at this time.     Attending will need to input referral for PCP so that pts follow up PCP visit can be scheduled.      03/28/25 9553   Discharge Planning   Expected Discharge Disposition Home        show